# Patient Record
Sex: MALE | Race: WHITE | URBAN - METROPOLITAN AREA
[De-identification: names, ages, dates, MRNs, and addresses within clinical notes are randomized per-mention and may not be internally consistent; named-entity substitution may affect disease eponyms.]

---

## 2020-09-15 ENCOUNTER — OFFICE VISIT CONVERTED (OUTPATIENT)
Dept: SURGERY | Facility: CLINIC | Age: 50
End: 2020-09-15
Attending: SURGERY

## 2020-10-17 ENCOUNTER — HOSPITAL ENCOUNTER (OUTPATIENT)
Dept: PREADMISSION TESTING | Facility: HOSPITAL | Age: 50
Discharge: HOME | End: 2020-10-17
Attending: SURGERY

## 2020-10-18 LAB — SARS-COV-2 RNA SPEC QL NAA+PROBE: NOT DETECTED

## 2020-10-22 ENCOUNTER — HOSPITAL ENCOUNTER (OUTPATIENT)
Dept: PERIOP | Facility: HOSPITAL | Age: 50
Setting detail: HOSPITAL OUTPATIENT SURGERY
Discharge: HOME | End: 2020-10-22
Attending: SURGERY

## 2020-11-13 ENCOUNTER — OFFICE VISIT CONVERTED (OUTPATIENT)
Dept: SURGERY | Facility: CLINIC | Age: 50
End: 2020-11-13
Attending: SURGERY

## 2021-05-10 NOTE — H&P
"   History and Physical      Patient Name: Brice Shaw   Patient ID: [id number]   Sex: Male   YOB: 1970        Visit Date: September 15, 2020    Provider: Bill Gracia MD   Location: [facility]   Location Address: [address] [place] [address]  [id number]   Location Phone: [phone]          Chief Complaint  · Outpatient History & Physical / Surgical Orders  · Hernia Consult      History Of Present Illness  Brice Shaw is a 50 year old male who presents to the office today as a consult from [facility].      The patient was referred for a ventral hernia.  The patient has a small bulge in the midline about 3 to 4 cm above his umbilicus.  The bulge is been present for at least a year and he is having pain where the bulge is located.  The pain occurs mainly with activity and lifting.  No prior abdominal surgeries.       Past Medical History  Disease Name Date Onset Notes   ***No Significant Medical History --  --          Allergy List  Allergen Name Date Reaction Notes   NO KNOWN DRUG ALLERGIES --  --  --          Family Medical History  Disease Name Relative/Age Notes   Diabetes, unspecified type Brother/   Brother   Prostate cancer Grandfather (paternal)/   Grandfather (paternal)         Social History  Finding Status Start/Stop Quantity Notes   Tobacco Never --/-- --  --          Review of Systems  · Constitutional  o Denies  o : fever, chills  · Cardiovascular  o Denies  o : chest pain on exertion  · Respiratory  o Denies  o : shortness of breath, cough  · Gastrointestinal  o Denies  o : nausea, vomiting      Vitals  Date Time BP Position Site L\R Cuff Size HR RR TEMP (F) WT  HT  BMI kg/m2 BSA m2 O2 Sat        09/15/2020 09:28 AM       14  283lbs 4oz 6'  4\" 34.48 2.62           Physical Examination  · Constitutional  o Appearance  o : healthy appearing, alert and in no acute distress, reliable historian, here alone  · Head and " Face  o Head  o :   § Inspection  § : no visable deformities or lesions  · Eyes  o Conjunctivae  o : clear  o Sclerae  o : clear  · Neck  o Inspection/Palpation  o : normal appearance, no masses, trachea midline  · Respiratory  o Respiratory Effort  o : breathing unlabored, respiratory effort appears normal  o Inspection of Chest  o : normal appearance, no retractions  · Cardiovascular  o Heart  o : regular rate and rhythm  · Gastrointestinal  o Abdominal Examination  o :   § Abdomen  § : soft, nontender, nondistended, small reducible bulge in midline about 2cm superior to umbilicus  · Skin and Subcutaneous Tissue  o General Inspection  o : no visible concerning rashes or lesions present, several tattoos  · Neurologic  o Cranial Nerves  o : no obvious motor deficits  o Sensation  o : no obvious sensory deficits  o Gait and Station  o :   § Gait Screening  § : normal gait, able to stand without diffculty  o Cerebellar Function  o : no obvious abnormalities  · Psychiatric  o Judgement and Insight  o : judgment and insight intact  o Mood and Affect  o : mood normal, affect appropriate          Assessment  · Pre-Surgical Orders     V72.84  · Hernia, Ventral umbilical     553.20/K43.9  · Preop testing     V72.84/Z01.818    Problems Reconciled  Plan  · Orders  o GENERAL SURGERY (GNSUR) - V72.84 - 10/22/2020  o [facility] Pre-Op Covid-19 Screening (06458) - V72.84/Z01.818 - 10/17/2020   at 215pm  · Medications  o Medications have been Reconciled  o Transition of Care or Provider Policy  · Instructions  o PLAN: robotic ventral umbilical hernia repair  o PLEASE SIGN PERMIT FOR: robotic ventral umbilical hernia repair  o Anesthesia: General   o Outpatient  o O.R. PREP: Per protocol  o IV: Per Anesthesia  o SCD's preoperatively  o *__Cefazolin 2 gram IV on call to OR.  o The indications, options, risks, benefits, and expected outcomes of the planned procedure were discussed with the patient and the patient agrees to proceed.    o Electronically Identified Patient Education Materials Provided Electronically  · Referrals  o ID: [id number] Date: 09/11/2020 Type: Inbound  Specialty: General Surgery            Electronically Signed by: Bill Gracia MD -Author on September 15, 2020 11:06:39 AM

## 2021-05-13 NOTE — PROGRESS NOTES
"   Progress Note      Patient Name: Brice Shaw   Patient ID: [id number]   Sex: Male   YOB: 1970        Visit Date: November 13, 2020    Provider: Bill Gracia MD   Location: [facility]   Location Address: [address] [place] [address]  [id number]   Location Phone: [phone]          Chief Complaint  · Follow up Surgery      History Of Present Illness  Brice Shaw is a 50 year old male who presents today for a postoperative visit.      The patient is here for follow-up after robotic ventral hernia repair.  He has some pain at his surgical sites but nothing more than is expected at this point in his recovery.  Abdominal exam is benign.  Incisions are healing well.  No new issues to address.  Patient is having satisfactory progress and he will see me as needed.       Past Medical History  ***No Significant Medical History         Allergy List  NO KNOWN DRUG ALLERGIES         Family Medical History  Diabetes, unspecified type; Prostate Cancer         Social History  Tobacco (Never)         Vitals  Date Time BP Position Site L\R Cuff Size HR RR TEMP (F) WT  HT  BMI kg/m2 BSA m2 O2 Sat FR L/min FiO2 HC       11/13/2020 01:03 PM       14  296lbs 2oz 6'  4\" 36.05 2.68                 Assessment  · Encounter for examination following surgery     V67.00/Z09      Plan  · Medications  o Medications have been Reconciled  o Transition of Care or Provider Policy  · Instructions  o See Above HPI section.            Electronically Signed by: Bill Gracia MD -Author on November 13, 2020 01:11:09 PM  "

## 2021-05-14 VITALS — WEIGHT: 296.12 LBS | RESPIRATION RATE: 14 BRPM | HEIGHT: 76 IN | BODY MASS INDEX: 36.06 KG/M2

## 2021-05-14 VITALS — WEIGHT: 283.25 LBS | HEIGHT: 76 IN | BODY MASS INDEX: 34.49 KG/M2 | RESPIRATION RATE: 14 BRPM
